# Patient Record
Sex: FEMALE | Race: WHITE | NOT HISPANIC OR LATINO | Employment: FULL TIME | URBAN - METROPOLITAN AREA
[De-identification: names, ages, dates, MRNs, and addresses within clinical notes are randomized per-mention and may not be internally consistent; named-entity substitution may affect disease eponyms.]

---

## 2018-08-06 DIAGNOSIS — J01.10 ACUTE NON-RECURRENT FRONTAL SINUSITIS: ICD-10-CM

## 2018-08-06 RX ORDER — AMOXICILLIN AND CLAVULANATE POTASSIUM 875; 125 MG/1; MG/1
1 TABLET, FILM COATED ORAL EVERY 12 HOURS
Qty: 20 TABLET | Refills: 0 | Status: SHIPPED | OUTPATIENT
Start: 2018-08-06 | End: 2023-07-03

## 2018-11-05 PROBLEM — J01.10 ACUTE NON-RECURRENT FRONTAL SINUSITIS: Status: RESOLVED | Noted: 2018-08-06 | Resolved: 2018-11-05

## 2020-02-17 DIAGNOSIS — G44.209 TENSION HEADACHE: ICD-10-CM

## 2020-02-17 RX ORDER — CYCLOBENZAPRINE HCL 10 MG
10 TABLET ORAL NIGHTLY
Qty: 30 TABLET | Refills: 1 | Status: SHIPPED | OUTPATIENT
Start: 2020-02-17 | End: 2020-02-27

## 2020-04-14 DIAGNOSIS — G43.909 MIGRAINE WITHOUT STATUS MIGRAINOSUS, NOT INTRACTABLE, UNSPECIFIED MIGRAINE TYPE: ICD-10-CM

## 2020-04-14 RX ORDER — TOPIRAMATE 200 MG/1
TABLET ORAL
Qty: 30 TABLET | Refills: 3 | OUTPATIENT
Start: 2020-04-14

## 2020-05-08 DIAGNOSIS — G43.909 MIGRAINE WITHOUT STATUS MIGRAINOSUS, NOT INTRACTABLE, UNSPECIFIED MIGRAINE TYPE: ICD-10-CM

## 2020-05-11 RX ORDER — TOPIRAMATE 200 MG/1
TABLET ORAL
Refills: 3 | OUTPATIENT
Start: 2020-05-11

## 2021-07-20 ENCOUNTER — HOSPITAL ENCOUNTER (OUTPATIENT)
Dept: RADIOLOGY | Facility: HOSPITAL | Age: 41
Discharge: HOME | End: 2021-07-20
Attending: OTOLARYNGOLOGY
Payer: COMMERCIAL

## 2021-07-20 DIAGNOSIS — J32.4 CHRONIC PANSINUSITIS: ICD-10-CM

## 2023-07-03 ENCOUNTER — OFFICE VISIT (OUTPATIENT)
Dept: URGENT CARE | Facility: CLINIC | Age: 43
End: 2023-07-03
Payer: COMMERCIAL

## 2023-07-03 VITALS
WEIGHT: 185 LBS | TEMPERATURE: 98 F | OXYGEN SATURATION: 99 % | DIASTOLIC BLOOD PRESSURE: 70 MMHG | HEART RATE: 74 BPM | HEIGHT: 72 IN | SYSTOLIC BLOOD PRESSURE: 108 MMHG | RESPIRATION RATE: 18 BRPM | BODY MASS INDEX: 25.06 KG/M2

## 2023-07-03 DIAGNOSIS — J32.9 BACTERIAL SINUSITIS: Primary | ICD-10-CM

## 2023-07-03 DIAGNOSIS — B96.89 BACTERIAL SINUSITIS: Primary | ICD-10-CM

## 2023-07-03 PROCEDURE — 99203 PR OFFICE/OUTPT VISIT, NEW, LEVL III, 30-44 MIN: ICD-10-PCS | Mod: S$GLB,,, | Performed by: NURSE PRACTITIONER

## 2023-07-03 PROCEDURE — 99203 OFFICE O/P NEW LOW 30 MIN: CPT | Mod: S$GLB,,, | Performed by: NURSE PRACTITIONER

## 2023-07-03 RX ORDER — FLUTICASONE PROPIONATE 50 MCG
1 SPRAY, SUSPENSION (ML) NASAL DAILY
Qty: 16 G | Refills: 0 | Status: SHIPPED | OUTPATIENT
Start: 2023-07-03

## 2023-07-03 RX ORDER — GALCANEZUMAB 120 MG/ML
INJECTION, SOLUTION SUBCUTANEOUS
COMMUNITY
Start: 2023-06-19

## 2023-07-03 RX ORDER — RIZATRIPTAN BENZOATE 10 MG/1
TABLET ORAL
COMMUNITY
Start: 2023-06-19

## 2023-07-03 RX ORDER — PREDNISONE 10 MG/1
TABLET ORAL
Qty: 8 TABLET | Refills: 0 | Status: SHIPPED | OUTPATIENT
Start: 2023-07-03 | End: 2024-02-15

## 2023-07-03 RX ORDER — PREGABALIN 25 MG/1
1 CAPSULE ORAL
COMMUNITY

## 2023-07-03 RX ORDER — AZITHROMYCIN 250 MG/1
TABLET, FILM COATED ORAL
Qty: 6 TABLET | Refills: 0 | Status: SHIPPED | OUTPATIENT
Start: 2023-07-03 | End: 2024-02-15

## 2023-07-03 NOTE — PATIENT INSTRUCTIONS
You must understand that you've received an Urgent Care treatment only and that you may be released before all your medical problems are known or treated. You, the patient, will arrange for follow up care as instructed.  Follow up with your PCP or specialty clinic as directed in the next 1-2 weeks if not improved or as needed.  You can call [phone] to schedule an appointment with the appropriate provider.  If your condition worsens we recommend that you receive another evaluation at the emergency room immediately or contact your primary medical clinics after hours call service to discuss your concerns.  Please return here or go to the Emergency Department for any concerns or worsening of condition.  Please if you smoke please consider quitting. [facility] Smoke cessation hotline number is [phone], available at this number is free counseling and medications to live a healthier life!       If you were prescribed a narcotic or controlled medication, do not drive or operate heavy equipment or machinery while taking these medications.    If you were not prescribed an antibiotic and your not better please return for a recheck. Antibiotic therapy is not always indicated initially.   Please attempt over the counter medications, give it time and try Echinacea, Zinc and Vitamin C to fight common colds and virus.

## 2023-07-03 NOTE — PROGRESS NOTES
Subjective:       Patient ID: Matthew Hawkins is a 42 y.o. female.    Vitals:  height is 6' (1.829 m) and weight is 83.9 kg (185 lb). Her oral temperature is 98.4 °F (36.9 °C). Her blood pressure is 108/70 and her pulse is 74. Her respiration is 18 and oxygen saturation is 99%.     Chief Complaint: Sinus Problem (Started Wednesday night with a sore throat which is resolving, sinus pressure, runny nose, sneezing, post nasal drip with brown and green mucus.)    This is a 42 y.o. female who presents today with a chief complaint of Started Wednesday night with a sore throat which is resolving, sinus pressure, runny nose, sneezing, post nasal drip with brown and green mucus.  Patient presents with:  Sinus Problem: Started Wednesday night with a sore throat which is resolving, sinus pressure, runny nose, sneezing, post nasal drip with brown and green mucus.         Sinus Problem  This is a new problem. The current episode started in the past 7 days. The problem is unchanged. Her pain is at a severity of 7/10. Associated symptoms include headaches, sinus pressure and sneezing. Past treatments include saline nose sprays, acetaminophen and nasal decongestants. The treatment provided no relief.     HENT:  Positive for sinus pressure.    Allergic/Immunologic: Positive for sneezing.   Neurological:  Positive for headaches.         Objective:      Physical Exam   Constitutional: She is oriented to person, place, and time. She appears well-developed. She is cooperative.  Non-toxic appearance. She does not appear ill. No distress.   HENT:   Head: Normocephalic and atraumatic.   Ears:   Right Ear: Hearing, external ear and ear canal normal. Tympanic membrane is injected and erythematous.   Left Ear: Hearing, external ear and ear canal normal. Tympanic membrane is injected and erythematous.   Nose: Rhinorrhea present. No mucosal edema or nasal deformity. No epistaxis. Right sinus exhibits no maxillary sinus tenderness and no  frontal sinus tenderness. Left sinus exhibits no maxillary sinus tenderness and no frontal sinus tenderness.   Mouth/Throat: Uvula is midline and mucous membranes are normal. No trismus in the jaw. Normal dentition. No uvula swelling. Posterior oropharyngeal erythema present. No oropharyngeal exudate or posterior oropharyngeal edema.   Eyes: Conjunctivae and lids are normal. No scleral icterus.   Neck: Trachea normal and phonation normal. Neck supple. No edema present. No erythema present. No neck rigidity present.   Cardiovascular: Normal rate, regular rhythm, normal heart sounds and normal pulses.   Pulmonary/Chest: Effort normal and breath sounds normal. No respiratory distress. She has no decreased breath sounds. She has no rhonchi.   Abdominal: Normal appearance.   Musculoskeletal: Normal range of motion.         General: No deformity. Normal range of motion.   Neurological: She is alert and oriented to person, place, and time. She exhibits normal muscle tone. Coordination normal.   Skin: Skin is warm, dry, intact, not diaphoretic and not pale.   Psychiatric: Her speech is normal and behavior is normal. Judgment and thought content normal.   Nursing note and vitals reviewed.      Past medical history and current medications reviewed.       Assessment:           1. Bacterial sinusitis              Plan:         Bacterial sinusitis  -     azithromycin (Z-MADELEINE) 250 MG tablet; Take 2 tablets by mouth on day 1; Take 1 tablet by mouth on days 2-5  Dispense: 6 tablet; Refill: 0  -     predniSONE (DELTASONE) 10 MG tablet; Take 2 tabs today, then 1 tab po qd x 6 days  Dispense: 8 tablet; Refill: 0  -     fluticasone propionate (FLONASE) 50 mcg/actuation nasal spray; 1 spray (50 mcg total) by Each Nostril route once daily.  Dispense: 16 g; Refill: 0             Patient Instructions     You must understand that you've received an Urgent Care treatment only and that you may be released before all your medical problems are  known or treated. You, the patient, will arrange for follow up care as instructed.  Follow up with your PCP or specialty clinic as directed in the next 1-2 weeks if not improved or as needed.  You can call [phone] to schedule an appointment with the appropriate provider.  If your condition worsens we recommend that you receive another evaluation at the emergency room immediately or contact your primary medical clinics after hours call service to discuss your concerns.  Please return here or go to the Emergency Department for any concerns or worsening of condition.  Please if you smoke please consider quitting. [facility] hotline number is [phone], available at this number is free counseling and medications to live a healthier life!       If you were prescribed a narcotic or controlled medication, do not drive or operate heavy equipment or machinery while taking these medications.    If you were not prescribed an antibiotic and your not better please return for a recheck. Antibiotic therapy is not always indicated initially.   Please attempt over the counter medications, give it time and try Echinacea, Zinc and Vitamin C to fight common colds and virus.

## 2024-02-15 ENCOUNTER — OFFICE VISIT (OUTPATIENT)
Dept: PODIATRY | Facility: CLINIC | Age: 44
End: 2024-02-15
Payer: COMMERCIAL

## 2024-02-15 VITALS
SYSTOLIC BLOOD PRESSURE: 114 MMHG | DIASTOLIC BLOOD PRESSURE: 75 MMHG | WEIGHT: 183 LBS | BODY MASS INDEX: 24.79 KG/M2 | HEIGHT: 72 IN | HEART RATE: 70 BPM

## 2024-02-15 DIAGNOSIS — L03.031 PARONYCHIA OF GREAT TOE, RIGHT: Primary | ICD-10-CM

## 2024-02-15 DIAGNOSIS — L60.0 INGROWN NAIL: ICD-10-CM

## 2024-02-15 PROCEDURE — 11750 EXCISION NAIL&NAIL MATRIX: CPT | Mod: T5,S$GLB,, | Performed by: PODIATRIST

## 2024-02-15 PROCEDURE — 99202 OFFICE O/P NEW SF 15 MIN: CPT | Mod: 25,S$GLB,, | Performed by: PODIATRIST

## 2024-02-15 PROCEDURE — 1159F MED LIST DOCD IN RCRD: CPT | Mod: S$GLB,,, | Performed by: PODIATRIST

## 2024-02-15 PROCEDURE — 3078F DIAST BP <80 MM HG: CPT | Mod: S$GLB,,, | Performed by: PODIATRIST

## 2024-02-15 PROCEDURE — 99999 PR PBB SHADOW E&M-EST. PATIENT-LVL III: CPT | Mod: PBBFAC,,, | Performed by: PODIATRIST

## 2024-02-15 PROCEDURE — 3008F BODY MASS INDEX DOCD: CPT | Mod: S$GLB,,, | Performed by: PODIATRIST

## 2024-02-15 PROCEDURE — 1160F RVW MEDS BY RX/DR IN RCRD: CPT | Mod: S$GLB,,, | Performed by: PODIATRIST

## 2024-02-15 PROCEDURE — 3074F SYST BP LT 130 MM HG: CPT | Mod: S$GLB,,, | Performed by: PODIATRIST

## 2024-02-15 RX ORDER — PENICILLIN V POTASSIUM 500 MG/1
TABLET, FILM COATED ORAL
COMMUNITY
Start: 2024-02-08 | End: 2024-02-17

## 2024-02-15 RX ORDER — ELETRIPTAN HYDROBROMIDE 20 MG/1
TABLET, FILM COATED ORAL
COMMUNITY

## 2024-02-18 PROBLEM — L03.031 PARONYCHIA OF GREAT TOE, RIGHT: Status: ACTIVE | Noted: 2024-02-18

## 2024-02-18 PROBLEM — L60.0 INGROWN NAIL: Status: ACTIVE | Noted: 2024-02-18

## 2024-02-18 NOTE — PROCEDURES
Nail Removal    Date/Time: 2/15/2024 4:15 PM    Performed by: Pro Roach DPM  Authorized by: Pro Roach DPM    Consent Done?:  Yes (Written)  Time out: Immediately prior to the procedure a time out was called    Prep: patient was prepped and draped in usual sterile fashion    Location:     Location:  Right foot    Location detail:  Right big toe  Anesthesia:     Anesthesia:  Local infiltration    Local anesthetic:  Lidocaine 1% without epinephrine and bupivacaine 0.5% without epinephrine    Anesthetic total (ml):  10  Procedure Details:     Preparation:  Skin prepped with alcohol    Amount removed:  Partial    Side:  Lateral    Wedge excision of skin of nail fold: No      Nail bed sutured?: No      Nail matrix removed:  Partial    Removal method:  Phenol and alcohol    Removed nail replaced and anchored: No      Dressing applied:  4x4, antibiotic ointment and gauze roll    Patient tolerance:  Patient tolerated the procedure well with no immediate complications

## 2024-02-18 NOTE — PROGRESS NOTES
Subjective:       Patient ID: Matthew Hawkins is a 43 y.o. female.    Chief Complaint: Ingrown Toenail (Right great toe/)  Patient presents today she is complaining of an ingrown toenail on her right great toe she states this has been excessively bad for the past 6-7 months she frequently has to dig and trim nail out of the side of her toe in the morning and the evening every day and she states it was infected at 1st but it has not been recently.  I saw the patient previously many years ago I did a total nail permanent matrixectomy on the left the patient's done great she has not had any problems questions or concerns she states she would like to have the permanent procedure performed on her right great toe if possible.    Past Medical History:   Diagnosis Date    Migraines      Past Surgical History:   Procedure Laterality Date    CARDIAC CATHETERIZATION      CARDIAC SURGERY      CHOLECYSTECTOMY      KNEE SURGERY      SINUS SURGERY       Family History   Problem Relation Age of Onset    Prostate cancer Father     Skin cancer Mother      Social History     Socioeconomic History    Marital status:    Tobacco Use    Smoking status: Never   Substance and Sexual Activity    Drug use: Never    Sexual activity: Yes     Partners: Male     Birth control/protection: I.U.D.     Comment: liletta inserted 3/6/19   Social History Narrative    ADVANCED MD PLANS        IUD insertion    Contraceptive Management        Visit Summary    Discussed IUD at length        Return for follow up appointment in one month for IUD check.     GYN Exam (Annual/6Wk PP)10 [facility]8/21/2018     Annual    IUD removal    Contraceptive management    Migraines        Visit Summary    Ordered:    glucose  abnormal w/ reflex  100    Blood Drawing    Lipid Panel    Pap w/ hpv        Discussed progesterone only methods with patient.    Pt wants to try Loloestrin first, discussed risks with chronic migraines. Sample pack given x 2 and a coupon card.     Pt is a nonsmoker.        Return for follow up appointment prn/annual.       Current Outpatient Medications   Medication Sig Dispense Refill    eletriptan (RELPAX) 20 MG tablet Take by mouth.      EMGALITY  mg/mL PnIj SMARTSI Milligram(s) SUB-Q Once a Month      fluticasone propionate (FLONASE) 50 mcg/actuation nasal spray 1 spray (50 mcg total) by Each Nostril route once daily. 16 g 0    onabotulinumtoxinA (BOTOX INJ) Inject as directed.      pregabalin (LYRICA) 25 MG capsule Take 1 capsule by mouth.      rizatriptan (MAXALT) 10 MG tablet SMARTSI Tablet(s) By Mouth 1-2 Times Daily      topiramate (TOPAMAX) 100 MG tablet Take 100 mg by mouth every evening.       No current facility-administered medications for this visit.     Review of patient's allergies indicates:  No Known Allergies    Review of Systems   Musculoskeletal:  Positive for arthralgias.   All other systems reviewed and are negative.      Objective:      Vitals:    02/15/24 1614   BP: 114/75   BP Location: Right arm   Patient Position: Sitting   Pulse: 70   Weight: 83 kg (183 lb)   Height: 6' (1.829 m)     Physical Exam  Vitals and nursing note reviewed.   Constitutional:       Appearance: Normal appearance.   Cardiovascular:      Pulses:           Dorsalis pedis pulses are 2+ on the right side and 2+ on the left side.        Posterior tibial pulses are 2+ on the right side and 2+ on the left side.   Pulmonary:      Effort: Pulmonary effort is normal.   Musculoskeletal:         General: Tenderness present.        Feet:    Feet:      Right foot:      Protective Sensation: 2 sites tested.  2 sites sensed.      Skin integrity: Erythema present.      Toenail Condition: Right toenails are ingrown.      Left foot:      Protective Sensation: 2 sites tested.  2 sites sensed.   Skin:     Capillary Refill: Capillary refill takes less than 2 seconds.      Findings: Erythema present.   Neurological:      General: No focal deficit present.       Mental Status: She is alert.   Psychiatric:         Mood and Affect: Mood normal.         Behavior: Behavior normal.            Assessment:       1. Paronychia of great toe, right    2. Ingrown nail        Plan:       Patient presents today she is complaining of an ingrown toenail on her right great toe she states this has been excessively bad for the past 6-7 months she frequently has to dig and trim nail out of the side of her toe in the morning and the evening every day and she states it was infected at 1st but it has not been recently.  I saw the patient previously many years ago I did a total nail permanent matrixectomy on the left the patient's done great she has not had any problems questions or concerns she states she would like to have the permanent procedure performed on her right great toe if possible.  A comprehensive new patient evaluation was performed today I was not able to see the patient's notes from previous visit many years ago as it was in a previous computer eyes system and not currently available.  I did evaluate the patient's left hallux which looks very good and she has not had any issues with after I had previously performed a permanent matrixectomy of the entire nail plate on the left on the right foot patient does have significantly ingrowing nail that you can see that the patient has previously trimmed on the lateral border of the right great toe.  Patient does not currently have infection although there is some erythema and irritation around the area patient does have pain upon palpation of the area.  I did discuss at length and in detail with the patient a permanent matrixectomy via verses a regular nail avulsion with the patient's history I feel doing a regular nail avulsion the nail will likely grow back and become problematic in the future patient indicated she would like to proceed with the permanent matrixectomy as discussed since she has had such good success with the permanent  procedure on the left great toe.  Patient underwent a permanent matrixectomy lateral border right hallux she tolerated the procedure well she was given postoperative instructions and will be seen for follow-up as needed patient advised any problems questions concerns certainly to contact us otherwise I will see her as needed.  Separate evaluation and management for new patient exam prior to making the decision to pursue the permanent matrixectomy was performed today.This note was created using [employer] voice recognition software that occasionally misinterpreted phrases or words.

## 2024-03-08 ENCOUNTER — HOSPITAL ENCOUNTER (OUTPATIENT)
Dept: RADIOLOGY | Facility: HOSPITAL | Age: 44
Discharge: HOME | End: 2024-03-08
Attending: OTOLARYNGOLOGY
Payer: COMMERCIAL

## 2024-03-08 DIAGNOSIS — J32.4 CHRONIC PANSINUSITIS: ICD-10-CM

## 2024-03-08 PROCEDURE — 70486 CT MAXILLOFACIAL W/O DYE: CPT | Mod: 26,,, | Performed by: RADIOLOGY

## 2024-03-08 PROCEDURE — 70486 CT MAXILLOFACIAL W/O DYE: CPT | Mod: TC

## 2024-05-01 ENCOUNTER — OFFICE VISIT (OUTPATIENT)
Dept: PODIATRY | Facility: CLINIC | Age: 44
End: 2024-05-01
Payer: COMMERCIAL

## 2024-05-01 VITALS — WEIGHT: 185 LBS | HEIGHT: 72 IN | BODY MASS INDEX: 25.06 KG/M2

## 2024-05-01 DIAGNOSIS — L60.0 INGROWN NAIL: Primary | ICD-10-CM

## 2024-05-01 PROCEDURE — 1160F RVW MEDS BY RX/DR IN RCRD: CPT | Mod: S$GLB,,, | Performed by: PODIATRIST

## 2024-05-01 PROCEDURE — 1159F MED LIST DOCD IN RCRD: CPT | Mod: S$GLB,,, | Performed by: PODIATRIST

## 2024-05-01 PROCEDURE — 3008F BODY MASS INDEX DOCD: CPT | Mod: S$GLB,,, | Performed by: PODIATRIST

## 2024-05-01 PROCEDURE — 99212 OFFICE O/P EST SF 10 MIN: CPT | Mod: S$GLB,,, | Performed by: PODIATRIST

## 2024-05-01 PROCEDURE — 99999 PR PBB SHADOW E&M-EST. PATIENT-LVL III: CPT | Mod: PBBFAC,,, | Performed by: PODIATRIST

## 2024-05-01 RX ORDER — NYSTATIN 100000 [USP'U]/ML
SUSPENSION ORAL
COMMUNITY
Start: 2024-02-19

## 2024-05-01 RX ORDER — NORTRIPTYLINE HYDROCHLORIDE 25 MG/1
25 CAPSULE ORAL NIGHTLY
COMMUNITY
Start: 2024-04-15

## 2024-05-04 NOTE — PROGRESS NOTES
Subjective:       Patient ID: Matthew Hawkins is a 43 y.o. female.    Chief Complaint: Nail Problem (Right great toe ) and Toe Pain  Patient presents today for follow-up she was concerned that she may be getting an ingrown toenail coming back on the right great toe lateral border where she had a previous permanent matrixectomy over 2 months ago.  Past Medical History:   Diagnosis Date    Migraines      Past Surgical History:   Procedure Laterality Date    CARDIAC CATHETERIZATION      CARDIAC SURGERY      CHOLECYSTECTOMY      KNEE SURGERY      SINUS SURGERY       Family History   Problem Relation Name Age of Onset    Prostate cancer Father      Skin cancer Mother       Social History     Socioeconomic History    Marital status:    Tobacco Use    Smoking status: Never   Substance and Sexual Activity    Drug use: Never    Sexual activity: Yes     Partners: Male     Birth control/protection: I.U.D.     Comment: liletta inserted 3/6/19   Social History Narrative    ADVANCED MD PLANS        IUD insertion    Contraceptive Management        Visit Summary    Discussed IUD at length        Return for follow up appointment in one month for IUD check.     [facility]     Annual    IUD removal    Contraceptive management    Migraines        Visit Summary    Ordered:    glucose  abnormal w/ reflex  100    Blood Drawing    Lipid Panel    Pap w/ hpv        Discussed progesterone only methods with patient.    Pt wants to try Loloestrin first, discussed risks with chronic migraines. Sample pack given x 2 and a coupon card.    Pt is a nonsmoker.        Return for follow up appointment prn/annual.       Current Outpatient Medications   Medication Sig Dispense Refill    eletriptan (RELPAX) 20 MG tablet Take by mouth.      fluticasone propionate (FLONASE) 50 mcg/actuation nasal spray 1 spray (50 mcg total) by Each Nostril route once daily. 16 g 0    nortriptyline (PAMELOR) 25 MG capsule Take 25  mg by mouth every evening.      nystatin (MYCOSTATIN) 100,000 unit/mL suspension take 5 milliliters by oral route 4 times every day      pregabalin (LYRICA) 25 MG capsule Take 1 capsule by mouth.      rizatriptan (MAXALT) 10 MG tablet SMARTSI Tablet(s) By Mouth 1-2 Times Daily      topiramate (TOPAMAX) 100 MG tablet Take 100 mg by mouth every evening.      onabotulinumtoxinA (BOTOX INJ) Inject as directed. (Patient not taking: Reported on 2024)       No current facility-administered medications for this visit.     Review of patient's allergies indicates:  No Known Allergies    Review of Systems   Musculoskeletal:  Positive for arthralgias.   All other systems reviewed and are negative.      Objective:      Vitals:    24 1114   Weight: 83.9 kg (185 lb)   Height: 6' (1.829 m)     Physical Exam  Vitals and nursing note reviewed.   Constitutional:       Appearance: Normal appearance.   Cardiovascular:      Pulses:           Dorsalis pedis pulses are 2+ on the right side and 2+ on the left side.        Posterior tibial pulses are 2+ on the right side and 2+ on the left side.   Pulmonary:      Effort: Pulmonary effort is normal.   Musculoskeletal:         General: Tenderness present.        Feet:    Feet:      Right foot:      Protective Sensation: 2 sites tested.  2 sites sensed.      Skin integrity: Skin integrity normal.      Left foot:      Protective Sensation: 2 sites tested.  2 sites sensed.   Skin:     General: Skin is warm.      Capillary Refill: Capillary refill takes less than 2 seconds.   Neurological:      General: No focal deficit present.      Mental Status: She is alert.   Psychiatric:         Mood and Affect: Mood normal.         Behavior: Behavior normal.            Assessment:       1. Ingrown nail        Plan:       Patient presents today for follow-up she was concerned that she may be getting an ingrown toenail coming back on the right great toe lateral border where she had a previous  permanent matrixectomy over 2 months ago.  On evaluation the area does not display ingrowing toenail nor does it display infection on the lateral border of the right hallux the patient does have some separation of the nail from the nail bed likely due to the procedure I carefully trimmed and debrided this area advising the patient as the nail grows out this should become less problematic I feel that she was just getting a little bit of pressure in association with some callus tissue along the lateral border right great toe.  Patient was in understanding and agreement with this nail was trimmed as was some of the callus tissue recommended follow-up as needed patient will contact us with any problems questions or concerns.  This note was created using [employer] voice recognition software that occasionally misinterpreted phrases or words.

## 2024-10-29 ENCOUNTER — HOSPITAL ENCOUNTER (OUTPATIENT)
Dept: RADIOLOGY | Facility: HOSPITAL | Age: 44
Discharge: HOME | End: 2024-10-29
Payer: COMMERCIAL

## 2024-10-29 DIAGNOSIS — G43.909 MIGRAINE: ICD-10-CM

## 2024-10-29 PROCEDURE — A9585 GADOBUTROL INJECTION: HCPCS

## 2024-10-29 PROCEDURE — 70553 MRI BRAIN STEM W/O & W/DYE: CPT | Mod: 26,,, | Performed by: RADIOLOGY

## 2024-10-29 PROCEDURE — 25500020 PHARM REV CODE 255

## 2024-10-29 PROCEDURE — 70553 MRI BRAIN STEM W/O & W/DYE: CPT | Mod: TC

## 2024-10-29 RX ORDER — GADOBUTROL 604.72 MG/ML
8 INJECTION INTRAVENOUS
Status: COMPLETED | OUTPATIENT
Start: 2024-10-29 | End: 2024-10-29

## 2024-10-29 RX ADMIN — GADOBUTROL 8 ML: 604.72 INJECTION INTRAVENOUS at 10:10

## 2024-11-22 ENCOUNTER — APPOINTMENT (OUTPATIENT)
Dept: LAB | Facility: HOSPITAL | Age: 44
End: 2024-11-22
Attending: OTOLARYNGOLOGY
Payer: COMMERCIAL

## 2024-11-22 DIAGNOSIS — J32.4 CHRONIC PANSINUSITIS: Primary | ICD-10-CM

## 2024-11-22 PROCEDURE — 87075 CULTR BACTERIA EXCEPT BLOOD: CPT | Performed by: OTOLARYNGOLOGY

## 2024-11-22 PROCEDURE — 87102 FUNGUS ISOLATION CULTURE: CPT | Performed by: OTOLARYNGOLOGY

## 2024-11-22 PROCEDURE — 87070 CULTURE OTHR SPECIMN AEROBIC: CPT | Performed by: OTOLARYNGOLOGY

## 2024-11-25 LAB — BACTERIA SPEC AEROBE CULT: NORMAL

## 2024-11-26 ENCOUNTER — ANESTHESIA EVENT (OUTPATIENT)
Dept: SURGERY | Facility: HOSPITAL | Age: 44
End: 2024-11-26
Payer: COMMERCIAL

## 2024-11-26 LAB — BACTERIA SPEC ANAEROBE CULT: NORMAL

## 2024-11-26 NOTE — ANESTHESIA PREPROCEDURE EVALUATION
11/26/2024  Matthew Hawkins is a 44 y.o., female.  has a past surgical history that includes Knee surgery; Sinus surgery; Cholecystectomy; Cardiac catheterization; and Cardiac surgery.   TMJ surgery 2023 wo issues      Pre-op Assessment    I have reviewed the Patient Summary Reports.     I have reviewed the Nursing Notes. I have reviewed the NPO Status.   I have reviewed the Medications.     Review of Systems  Anesthesia Hx:  No problems with previous Anesthesia             Denies Family Hx of Anesthesia complications.    Denies Personal Hx of Anesthesia complications.                    Social:  Non-Smoker       Hematology/Oncology:  Hematology Normal   Oncology Normal                                   EENT/Dental:  EENT/Dental Normal  Chronic sinusitis          Cardiovascular:  Cardiovascular Normal                  ECG has been reviewed. EKG reviewed.  2023.  Outside record.  SB, 58  Chart indicates cath in past -  states ahd an ablation at age 19, no further issues since.                           Pulmonary:  Pulmonary Normal                       Renal/:  Renal/ Normal                 Hepatic/GI:  Hepatic/GI Normal                    Musculoskeletal:  Musculoskeletal Normal                Neurological:      Headaches     MRI unremarkable                            Endocrine:  Endocrine Normal            Dermatological:  Skin Normal    Psych:  Psychiatric Normal                    Physical Exam  General: Well nourished, Cooperative, Alert and Oriented    Airway:  Mallampati: II   Mouth Opening: Normal  TM Distance: 4 - 6 cm  Tongue: Normal  Neck ROM: Normal ROM    Dental:  Intact    Chest/Lungs:  Clear to auscultation    Heart:  Rate: Normal  Rhythm: Regular Rhythm  Sounds: Normal        Anesthesia Plan  Type of Anesthesia, risks & benefits discussed:    Anesthesia Type: Gen ETT  Intra-op  Monitoring Plan: Standard ASA Monitors  Post Op Pain Control Plan: multimodal analgesia  Induction:  IV  Airway Plan: Video, Post-Induction  Informed Consent: Informed consent signed with the Patient and all parties understand the risks and agree with anesthesia plan.  All questions answered.   ASA Score: 2  Day of Surgery Review of History & Physical: H&P Update referred to the surgeon/provider.    Ready For Surgery From Anesthesia Perspective.     .

## 2024-11-27 ENCOUNTER — LAB VISIT (OUTPATIENT)
Dept: LAB | Facility: HOSPITAL | Age: 44
End: 2024-11-27
Attending: OTOLARYNGOLOGY
Payer: COMMERCIAL

## 2024-11-27 DIAGNOSIS — Z01.818 PRE-OP TESTING: Primary | ICD-10-CM

## 2024-11-27 LAB
ALBUMIN SERPL BCP-MCNC: 3.9 G/DL (ref 3.5–5.2)
ALP SERPL-CCNC: 69 U/L (ref 40–150)
ALT SERPL W/O P-5'-P-CCNC: 25 U/L (ref 10–44)
ANION GAP SERPL CALC-SCNC: 11 MMOL/L (ref 8–16)
AST SERPL-CCNC: 20 U/L (ref 10–40)
B-HCG UR QL: NEGATIVE
BILIRUB SERPL-MCNC: 0.5 MG/DL (ref 0.1–1)
BUN SERPL-MCNC: 11 MG/DL (ref 6–20)
CALCIUM SERPL-MCNC: 9.6 MG/DL (ref 8.7–10.5)
CHLORIDE SERPL-SCNC: 108 MMOL/L (ref 95–110)
CO2 SERPL-SCNC: 21 MMOL/L (ref 23–29)
CREAT SERPL-MCNC: 0.8 MG/DL (ref 0.5–1.4)
ERYTHROCYTE [DISTWIDTH] IN BLOOD BY AUTOMATED COUNT: 12.9 % (ref 11.5–14.5)
EST. GFR  (NO RACE VARIABLE): >60 ML/MIN/1.73 M^2
GLUCOSE SERPL-MCNC: 105 MG/DL (ref 70–110)
HCT VFR BLD AUTO: 42.2 % (ref 37–48.5)
HGB BLD-MCNC: 13.7 G/DL (ref 12–16)
MCH RBC QN AUTO: 32.5 PG (ref 27–31)
MCHC RBC AUTO-ENTMCNC: 32.5 G/DL (ref 32–36)
MCV RBC AUTO: 100 FL (ref 82–98)
PLATELET # BLD AUTO: 334 K/UL (ref 150–450)
PMV BLD AUTO: 9.7 FL (ref 9.2–12.9)
POTASSIUM SERPL-SCNC: 3.9 MMOL/L (ref 3.5–5.1)
PROT SERPL-MCNC: 7.1 G/DL (ref 6–8.4)
RBC # BLD AUTO: 4.22 M/UL (ref 4–5.4)
SODIUM SERPL-SCNC: 140 MMOL/L (ref 136–145)
WBC # BLD AUTO: 9.61 K/UL (ref 3.9–12.7)

## 2024-11-27 PROCEDURE — 85027 COMPLETE CBC AUTOMATED: CPT | Performed by: OTOLARYNGOLOGY

## 2024-11-27 PROCEDURE — 36415 COLL VENOUS BLD VENIPUNCTURE: CPT | Performed by: OTOLARYNGOLOGY

## 2024-11-27 PROCEDURE — 80053 COMPREHEN METABOLIC PANEL: CPT | Performed by: OTOLARYNGOLOGY

## 2024-11-27 PROCEDURE — 81025 URINE PREGNANCY TEST: CPT | Performed by: OTOLARYNGOLOGY

## 2024-11-28 LAB — FUNGUS SPEC CULT: NORMAL

## 2024-11-29 ENCOUNTER — HOSPITAL ENCOUNTER (OUTPATIENT)
Facility: HOSPITAL | Age: 44
Discharge: HOME | End: 2024-11-29
Attending: OTOLARYNGOLOGY | Admitting: OTOLARYNGOLOGY
Payer: COMMERCIAL

## 2024-11-29 ENCOUNTER — ANESTHESIA (OUTPATIENT)
Dept: SURGERY | Facility: HOSPITAL | Age: 44
End: 2024-11-29
Payer: COMMERCIAL

## 2024-11-29 VITALS
HEART RATE: 65 BPM | TEMPERATURE: 98 F | DIASTOLIC BLOOD PRESSURE: 75 MMHG | RESPIRATION RATE: 15 BRPM | WEIGHT: 192 LBS | SYSTOLIC BLOOD PRESSURE: 129 MMHG | OXYGEN SATURATION: 98 % | BODY MASS INDEX: 26.01 KG/M2 | HEIGHT: 72 IN

## 2024-11-29 PROBLEM — J32.3 CHRONIC SPHENOIDAL SINUSITIS: Status: ACTIVE | Noted: 2024-11-29

## 2024-11-29 PROBLEM — J32.2 CHRONIC ETHMOIDAL SINUSITIS: Status: ACTIVE | Noted: 2024-11-29

## 2024-11-29 PROBLEM — J98.8 UPPER RESPIRATORY TRACT OBSTRUCTION: Status: RESOLVED | Noted: 2024-11-29 | Resolved: 2024-11-29

## 2024-11-29 PROBLEM — J32.0 CHRONIC MAXILLARY SINUSITIS: Status: ACTIVE | Noted: 2024-11-29

## 2024-11-29 PROBLEM — J34.3 HYPERTROPHY OF NASAL TURBINATES: Status: RESOLVED | Noted: 2024-11-29 | Resolved: 2024-11-29

## 2024-11-29 PROBLEM — J32.0 CHRONIC MAXILLARY SINUSITIS: Status: RESOLVED | Noted: 2024-11-29 | Resolved: 2024-11-29

## 2024-11-29 PROBLEM — J98.8 UPPER RESPIRATORY TRACT OBSTRUCTION: Status: ACTIVE | Noted: 2024-11-29

## 2024-11-29 PROBLEM — J32.3 CHRONIC SPHENOIDAL SINUSITIS: Status: RESOLVED | Noted: 2024-11-29 | Resolved: 2024-11-29

## 2024-11-29 PROBLEM — J34.3 HYPERTROPHY OF NASAL TURBINATES: Status: ACTIVE | Noted: 2024-11-29

## 2024-11-29 PROBLEM — J32.2 CHRONIC ETHMOIDAL SINUSITIS: Status: RESOLVED | Noted: 2024-11-29 | Resolved: 2024-11-29

## 2024-11-29 PROCEDURE — 87102 FUNGUS ISOLATION CULTURE: CPT | Performed by: OTOLARYNGOLOGY

## 2024-11-29 PROCEDURE — 27201423 OPTIME MED/SURG SUP & DEVICES STERILE SUPPLY: Performed by: OTOLARYNGOLOGY

## 2024-11-29 PROCEDURE — 71000033 HC RECOVERY, INTIAL HOUR: Performed by: OTOLARYNGOLOGY

## 2024-11-29 PROCEDURE — C1726 CATH, BAL DIL, NON-VASCULAR: HCPCS | Performed by: OTOLARYNGOLOGY

## 2024-11-29 PROCEDURE — 87070 CULTURE OTHR SPECIMN AEROBIC: CPT | Performed by: OTOLARYNGOLOGY

## 2024-11-29 PROCEDURE — 25000003 PHARM REV CODE 250: Performed by: OTOLARYNGOLOGY

## 2024-11-29 PROCEDURE — 36000709 HC OR TIME LEV III EA ADD 15 MIN: Performed by: OTOLARYNGOLOGY

## 2024-11-29 PROCEDURE — 63600175 PHARM REV CODE 636 W HCPCS

## 2024-11-29 PROCEDURE — 37000009 HC ANESTHESIA EA ADD 15 MINS: Performed by: OTOLARYNGOLOGY

## 2024-11-29 PROCEDURE — 87077 CULTURE AEROBIC IDENTIFY: CPT | Performed by: OTOLARYNGOLOGY

## 2024-11-29 PROCEDURE — 87076 CULTURE ANAEROBE IDENT EACH: CPT | Performed by: OTOLARYNGOLOGY

## 2024-11-29 PROCEDURE — 71000015 HC POSTOP RECOV 1ST HR: Performed by: OTOLARYNGOLOGY

## 2024-11-29 PROCEDURE — 37000008 HC ANESTHESIA 1ST 15 MINUTES: Performed by: OTOLARYNGOLOGY

## 2024-11-29 PROCEDURE — 63600175 PHARM REV CODE 636 W HCPCS: Performed by: OTOLARYNGOLOGY

## 2024-11-29 PROCEDURE — 36000708 HC OR TIME LEV III 1ST 15 MIN: Performed by: OTOLARYNGOLOGY

## 2024-11-29 PROCEDURE — 87075 CULTR BACTERIA EXCEPT BLOOD: CPT | Performed by: OTOLARYNGOLOGY

## 2024-11-29 PROCEDURE — 25000003 PHARM REV CODE 250

## 2024-11-29 PROCEDURE — 87186 SC STD MICRODIL/AGAR DIL: CPT | Performed by: OTOLARYNGOLOGY

## 2024-11-29 RX ORDER — MIDAZOLAM HYDROCHLORIDE 1 MG/ML
INJECTION INTRAMUSCULAR; INTRAVENOUS
Status: DISCONTINUED | OUTPATIENT
Start: 2024-11-29 | End: 2024-11-29

## 2024-11-29 RX ORDER — OXYMETAZOLINE HCL 0.05 %
SPRAY, NON-AEROSOL (ML) NASAL
Status: DISCONTINUED | OUTPATIENT
Start: 2024-11-29 | End: 2024-11-29 | Stop reason: HOSPADM

## 2024-11-29 RX ORDER — GLUCAGON 1 MG
1 KIT INJECTION
Status: DISCONTINUED | OUTPATIENT
Start: 2024-11-29 | End: 2024-11-29 | Stop reason: HOSPADM

## 2024-11-29 RX ORDER — LIDOCAINE HYDROCHLORIDE 10 MG/ML
1 INJECTION, SOLUTION EPIDURAL; INFILTRATION; INTRACAUDAL; PERINEURAL ONCE
Status: DISCONTINUED | OUTPATIENT
Start: 2024-11-29 | End: 2024-11-29 | Stop reason: HOSPADM

## 2024-11-29 RX ORDER — OXYCODONE HYDROCHLORIDE 5 MG/1
5 TABLET ORAL ONCE AS NEEDED
Status: DISCONTINUED | OUTPATIENT
Start: 2024-11-29 | End: 2024-11-29 | Stop reason: HOSPADM

## 2024-11-29 RX ORDER — BUPIVACAINE HYDROCHLORIDE 5 MG/ML
INJECTION, SOLUTION EPIDURAL; INTRACAUDAL
Status: DISCONTINUED | OUTPATIENT
Start: 2024-11-29 | End: 2024-11-29 | Stop reason: HOSPADM

## 2024-11-29 RX ORDER — ONDANSETRON HYDROCHLORIDE 2 MG/ML
INJECTION, SOLUTION INTRAMUSCULAR; INTRAVENOUS
Status: DISCONTINUED | OUTPATIENT
Start: 2024-11-29 | End: 2024-11-29

## 2024-11-29 RX ORDER — MEPERIDINE HYDROCHLORIDE 50 MG/ML
12.5 INJECTION INTRAMUSCULAR; INTRAVENOUS; SUBCUTANEOUS EVERY 10 MIN PRN
Status: DISCONTINUED | OUTPATIENT
Start: 2024-11-29 | End: 2024-11-29 | Stop reason: HOSPADM

## 2024-11-29 RX ORDER — HYDROMORPHONE HYDROCHLORIDE 2 MG/ML
0.5 INJECTION, SOLUTION INTRAMUSCULAR; INTRAVENOUS; SUBCUTANEOUS EVERY 5 MIN PRN
Status: DISCONTINUED | OUTPATIENT
Start: 2024-11-29 | End: 2024-11-29 | Stop reason: HOSPADM

## 2024-11-29 RX ORDER — FENTANYL CITRATE 50 UG/ML
INJECTION, SOLUTION INTRAMUSCULAR; INTRAVENOUS
Status: DISCONTINUED | OUTPATIENT
Start: 2024-11-29 | End: 2024-11-29

## 2024-11-29 RX ORDER — PROPOFOL 10 MG/ML
VIAL (ML) INTRAVENOUS
Status: DISCONTINUED | OUTPATIENT
Start: 2024-11-29 | End: 2024-11-29

## 2024-11-29 RX ORDER — SODIUM CHLORIDE, SODIUM LACTATE, POTASSIUM CHLORIDE, CALCIUM CHLORIDE 600; 310; 30; 20 MG/100ML; MG/100ML; MG/100ML; MG/100ML
125 INJECTION, SOLUTION INTRAVENOUS CONTINUOUS
Status: DISCONTINUED | OUTPATIENT
Start: 2024-11-29 | End: 2024-11-29 | Stop reason: HOSPADM

## 2024-11-29 RX ORDER — ROCURONIUM BROMIDE 10 MG/ML
INJECTION, SOLUTION INTRAVENOUS
Status: DISCONTINUED | OUTPATIENT
Start: 2024-11-29 | End: 2024-11-29

## 2024-11-29 RX ORDER — LIDOCAINE HCL/EPINEPHRINE/PF 2%-1:200K
VIAL (ML) INJECTION
Status: DISCONTINUED | OUTPATIENT
Start: 2024-11-29 | End: 2024-11-29 | Stop reason: HOSPADM

## 2024-11-29 RX ORDER — ONDANSETRON HYDROCHLORIDE 2 MG/ML
4 INJECTION, SOLUTION INTRAVENOUS DAILY PRN
Status: DISCONTINUED | OUTPATIENT
Start: 2024-11-29 | End: 2024-11-29 | Stop reason: HOSPADM

## 2024-11-29 RX ORDER — TRIAMCINOLONE ACETONIDE 40 MG/ML
INJECTION, SUSPENSION INTRA-ARTICULAR; INTRAMUSCULAR
Status: DISCONTINUED | OUTPATIENT
Start: 2024-11-29 | End: 2024-11-29 | Stop reason: HOSPADM

## 2024-11-29 RX ORDER — CEFAZOLIN SODIUM 1 G/3ML
INJECTION, POWDER, FOR SOLUTION INTRAMUSCULAR; INTRAVENOUS
Status: DISCONTINUED | OUTPATIENT
Start: 2024-11-29 | End: 2024-11-29

## 2024-11-29 RX ORDER — LIDOCAINE HYDROCHLORIDE 20 MG/ML
INJECTION, SOLUTION EPIDURAL; INFILTRATION; INTRACAUDAL; PERINEURAL
Status: DISCONTINUED | OUTPATIENT
Start: 2024-11-29 | End: 2024-11-29

## 2024-11-29 RX ADMIN — ONDANSETRON 4 MG: 2 INJECTION INTRAMUSCULAR; INTRAVENOUS at 09:11

## 2024-11-29 RX ADMIN — CEFAZOLIN 1 G: 330 INJECTION, POWDER, FOR SOLUTION INTRAMUSCULAR; INTRAVENOUS at 09:11

## 2024-11-29 RX ADMIN — SUGAMMADEX 200 MG: 100 INJECTION, SOLUTION INTRAVENOUS at 10:11

## 2024-11-29 RX ADMIN — ROCURONIUM BROMIDE 20 MG: 10 INJECTION, SOLUTION INTRAVENOUS at 10:11

## 2024-11-29 RX ADMIN — SODIUM CHLORIDE, POTASSIUM CHLORIDE, SODIUM LACTATE AND CALCIUM CHLORIDE: 600; 310; 30; 20 INJECTION, SOLUTION INTRAVENOUS at 09:11

## 2024-11-29 RX ADMIN — PROPOFOL 150 MG: 10 INJECTION, EMULSION INTRAVENOUS at 09:11

## 2024-11-29 RX ADMIN — FENTANYL CITRATE 100 MCG: 50 INJECTION INTRAMUSCULAR; INTRAVENOUS at 09:11

## 2024-11-29 RX ADMIN — ROCURONIUM BROMIDE 50 MG: 10 INJECTION, SOLUTION INTRAVENOUS at 09:11

## 2024-11-29 RX ADMIN — LIDOCAINE HYDROCHLORIDE 50 MG: 20 INJECTION, SOLUTION EPIDURAL; INFILTRATION; INTRACAUDAL; PERINEURAL at 09:11

## 2024-11-29 RX ADMIN — MIDAZOLAM HYDROCHLORIDE 2 MG: 1 INJECTION, SOLUTION INTRAMUSCULAR; INTRAVENOUS at 09:11

## 2024-11-29 NOTE — DISCHARGE SUMMARY
[place] - Surgery    Discharge Note        SUMMARY     Admit Date: 2024    Attending Physician: Dennis Martinez MD     Discharge Physician: Dennis Martinez MD    Discharge Date: 2024 11:48 AM    Final Diagnosis: chronic sinusitis    Hospital Course: Patient tolerated procedure well.     Disposition: Home or Self Care    Patient Instructions:   Current Discharge Medication List        CONTINUE these medications which have NOT CHANGED    Details   eletriptan (RELPAX) 20 MG tablet Take by mouth.      fluticasone propionate (FLONASE) 50 mcg/actuation nasal spray 1 spray (50 mcg total) by Each Nostril route once daily.  Qty: 16 g, Refills: 0    Associated Diagnoses: Bacterial sinusitis      nortriptyline (PAMELOR) 25 MG capsule Take 25 mg by mouth every evening.      pregabalin (LYRICA) 25 MG capsule Take 1 capsule by mouth.      topiramate (TOPAMAX) 100 MG tablet Take 100 mg by mouth every evening.      nystatin (MYCOSTATIN) 100,000 unit/mL suspension take 5 milliliters by oral route 4 times every day      onabotulinumtoxinA (BOTOX INJ) Inject as directed.      rizatriptan (MAXALT) 10 MG tablet SMARTSI Tablet(s) By Mouth 1-2 Times Daily             Discharge Procedure Orders (must include Diet, Follow-up, Activity):  No discharge procedures on file.     Follow Up:  Follow up as scheduled.  Resume routine diet.  Activity as tolerated.

## 2024-11-29 NOTE — TRANSFER OF CARE
Anesthesia Transfer of Care Note    Patient: Matthew Hawkins    Procedure(s) Performed: Procedure(s) (LRB):  FESS (Bilateral)  NASAL TURBINECTOMY (Bilateral)    Patient location: PACU    Anesthesia Type: general    Transport from OR: Transported from OR on room air with adequate spontaneous ventilation    Post pain: adequate analgesia    Post assessment: no apparent anesthetic complications and tolerated procedure well    Post vital signs: stable    Level of consciousness: awake, alert and oriented    Nausea/Vomiting: no nausea/vomiting    Complications: none    Transfer of care protocol was followed      Last vitals: Visit Vitals  /70   Pulse 76   Temp 36.6 °C (97.8 °F) (Temporal)   Resp 10   Ht 6' (1.829 m)   Wt 87.1 kg (192 lb)   SpO2 97%   Breastfeeding No   BMI 26.04 kg/m²

## 2024-11-29 NOTE — PLAN OF CARE
Dr. Martinez to bedside speaking with patient and father regarding surgical outcome and discharge plan of care. V/u.

## 2024-11-29 NOTE — OP NOTE
[facility] - [facility]  Operative Note     SUMMARY     Surgery Date: 11/29/2024       Pre-op Diagnosis:  Chronic ethmoidal sinusitis [J32.2]  Chronic sphenoidal sinusitis [J32.3]  Chronic frontal sinusitis [J32.1]  Chronic maxillary sinusitis [J32.0]  Hypertrophy of nasal turbinates [J34.3]  Upper respiratory tract obstruction [J98.8]    Post-op Diagnosis:  Post-Op Diagnosis Codes:     * Chronic ethmoidal sinusitis [J32.2]     * Chronic sphenoidal sinusitis [J32.3]     * Chronic frontal sinusitis [J32.1]     * Chronic maxillary sinusitis [J32.0]     * Hypertrophy of nasal turbinates [J34.3]     * Upper respiratory tract obstruction [J98.8]    Procedure(s) (LRB):  FESS (Bilateral)  NASAL TURBINECTOMY (Bilateral)    Surgeons and Role:     * Dennis Martinez MD - Primary      Estimated Blood Loss: * No values recorded between 11/29/2024  9:13 AM and 11/29/2024 10:37 AM *    Anesthesia:  General    Description of the findings of the procedure:  Chronic ethmoidal sinusitis [J32.2]  Chronic sphenoidal sinusitis [J32.3]  Chronic frontal sinusitis [J32.1]  Chronic maxillary sinusitis [J32.0]  Hypertrophy of nasal turbinates [J34.3]  Upper respiratory tract obstruction [J98.8]           Procedure: [facility] - [facility]  Operative Note    OP Note      Date of Procedure: 11/29/2024       Pre-Operative Diagnosis:   Chronic ethmoidal sinusitis [J32.2]  Chronic sphenoidal sinusitis [J32.3]  Chronic frontal sinusitis [J32.1]  Chronic maxillary sinusitis [J32.0]  Hypertrophy of nasal turbinates [J34.3]  Upper respiratory tract obstruction [J98.8]    Post-Operative Diagnosis:   Post-Op Diagnosis Codes:     * Chronic ethmoidal sinusitis [J32.2]     * Chronic sphenoidal sinusitis [J32.3]     * Chronic frontal sinusitis [J32.1]     * Chronic maxillary sinusitis [J32.0]     * Hypertrophy of nasal turbinates [J34.3]     * Upper respiratory tract obstruction [J98.8]    Anesthesia: General    Procedures performed:   FESS: 36346 (CPT®)  NASAL  TURBINECTOMY: 35155 (CPT®)    Surgeon: Dennis Martinez MD    Procedure In Detail:   The patient was taken to the operating room and placed in the supine position. An IV was placed into the patient's arm. After a satisfactory general anesthetic had been obtained, the procedure was begun. Four pledgets of 4 mL of oxymetazoline were placed into the patient's nose. The patient's nose was injected with lidocaine 1% with epinephrine 1:100,000; approximately 10 mL were used. The patient was prepped and draped in the standard fashion for a nasal operation.    Under endoscopic vision, the [place] was walked down the lateral nasal wall of the right nostril until it fell into the anterior ethmoid cells. The anterior ethmoid cells were exenterated back to the lamina ethmoidalis. The lamina ethmoidalis was breached and the posterior ethmoid cells exenterated back to the rostrum of the sphenoid. Under endoscopic vision, the sphenoid ostium was viewed and palpated and opened to approximately 5 mm in diameter. The 30 degree scope was taken and the maxillary sinus viewed and palpated and the ostium opened to approximately 1 cm in diameter. With the 70 degree scope, the nasofrontal duct area was viewed and the anterior ethmoid cells taken down until the nasofrontal duct was seen to empty widely into the nose. This nostril was then packed with 2 oxymetazoline pledgets and attention turned to the left side.    The Monty was walked down the lateral nasal wall of the left nostril until it fell into the anterior ethmoid cells. The anterior ethmoid cells were exenterated back to the lamina ethmoidalis. The lamina ethmoidalis was breached and the posterior ethmoid cells exenterated back to the rostrum of the sphenoid. The 0 degree scope was taken and the sphenoid ostium viewed and palpated. This was opened to approximately 5 mm in diameter. The 30 degree scope was taken and the maxillary sinus ostium viewed and palpated and opened to  approximately 1 cm in diameter. The 70 degree scope was taken and the nasofrontal duct area viewed, and the anterior ethmoid cells in this region were taken down until the nasofrontal duct was seen to empty widely into the nose. The left nostril was then packed with oxymetazoline pledgets.    The pledgets were removed.  The septum was viewed. A caudal incision was performed, and a mucoperichondrial flap elevated back to the bony cartilaginous junction. A mucoperiosteal flap was elevated on both sides of the bony cartilaginous junction. The anterior 5 mm of bony septum was then taken down from inferior to superior, and the posterior 5 mm of cartilaginous septum was removed. A 3 mm strip of the inferior edge of the cartilaginous septum was removed. This allowed the septum to swing back into the midline over the crest of the maxilla. The areas of deflexion along the cartilaginous septum were then conservatively removed. The septum was then found to be straight in the midline. The caudal incision was then closed with 4-0 plain and a 4-0 whipstitch placed through the septum.    Next, the left and right inferior turbinates were then viewed. They were both hypertrophic producing nasal airway obstruction. The anterior tips of each turbinate were then injected with lidocaine 1% with 1:100,000 epinephrine; approximately 2 mL were used in each turbinate. This was injected over the anterior 2 cm. A bovie was then taken and used to abad the anterior tip of both the left and right inferior turbinate. This was carried back submucosally while being activated, 2 cm along the medial and inferior border of the left and right inferior turbinate. This was done until substantial volume reduction had been accomplished. After removing the bovie from each turbinate, the turbinate was viewed and found to be markedly reduced in size. Each turbinate was then out fractured to open the nose further. Hemostasis was obtained.   A solution of  marcaine  and lidocaine and dexamethasone was  injected into four areas, the sphenopalatine ganglion canal thru the posterior palate, The sphenopalatine area at the junction of the posterior turbinate and sphenoid rostrum, the anterior root of the middle turbinate and  the superciliary nerves as the exit the skull.    This was all done on the left. The left sphenoid sinus was repeatedly lavaged with saline.  Cultures were taken of the left sphenoid sinus prior to beginning the procedure.There was a yellow bubbling purulence emanating from the sinus   The nose and sinuses were packed with Hoffmann nasal packs coated with bacitracin and Sinus packs coated with bacitracin as needed. The patient was awakened and taken to the recovery room in stable condition.

## 2024-11-29 NOTE — BRIEF OP NOTE
[facility] - Surgery  Brief Operative Note     SUMMARY     Surgery Date: 11/29/2024     Surgeons and Role:     * Dennis Martinez MD - Primary        Pre-op Diagnosis:  <principal problem not specified>     Post-op Diagnosis:  Post-Op Diagnosis Codes:     * Chronic ethmoidal sinusitis [J32.2]     * Chronic sphenoidal sinusitis [J32.3]     * Chronic frontal sinusitis [J32.1]     * Chronic maxillary sinusitis [J32.0]     * Hypertrophy of nasal turbinates [J34.3]     * Upper respiratory tract obstruction [J98.8]    Procedure(s) (LRB):  FESS (Bilateral)  NASAL TURBINECTOMY (Bilateral)      Description of the findings of the procedure:  <principal problem not specified>       Estimated Blood Loss: * No values recorded between 11/29/2024  9:13 AM and 11/29/2024 10:37 AM *

## 2024-11-29 NOTE — ANESTHESIA POSTPROCEDURE EVALUATION
Anesthesia Post Evaluation    Patient: Matthew Hawkins    Procedure(s) Performed: Procedure(s) (LRB):  FESS (Bilateral)  NASAL TURBINECTOMY (Bilateral)    Final Anesthesia Type: general      Patient location during evaluation: PACU  Patient participation: Yes- Able to Participate  Level of consciousness: awake and alert and oriented  Post-procedure vital signs: reviewed and stable  Pain management: adequate  Airway patency: patent    PONV status at discharge: No PONV  Anesthetic complications: no      Cardiovascular status: hemodynamically stable  Respiratory status: unassisted, spontaneous ventilation and room air  Hydration status: euvolemic  Follow-up not needed.              Vitals Value Taken Time   /75 11/29/24 1147   Temp 36.6 °C (97.8 °F) 11/29/24 1040   Pulse 73 11/29/24 1149   Resp 21 11/29/24 1149   SpO2 98 % 11/29/24 1147   Vitals shown include unfiled device data.      Event Time   Out of Recovery 11:10:00         Pain/Hiral Score: Hiral Score: 10 (11/29/2024 11:10 AM)  Modified Hiral Score: 20 (11/29/2024 12:00 PM)

## 2024-11-29 NOTE — ANESTHESIA PROCEDURE NOTES
Intubation    Date/Time: 11/29/2024 9:08 AM    Performed by: Chandni Ocasio  Authorized by: Stephan Montgomery MD    Intubation:     Induction:  Intravenous    Intubated:  Postinduction    Mask Ventilation:  Easy mask    Attempts:  1    Attempted By:  CRNA    Method of Intubation:  Video laryngoscopy    Blade:  Martínez 3    Laryngeal View Grade: Grade I - full view of cords      Difficult Airway Encountered?: No      Complications:  None    Airway Device:  Oral endotracheal tube    Airway Device Size:  7.0    Style/Cuff Inflation:  Cuffed    Inflation Amount (mL):  7    Tube secured:  22    Secured at:  The teeth    Placement Verified By:  Capnometry and Fiber optic visualization    Complicating Factors:  None    Findings Post-Intubation:  BS equal bilateral and atraumatic/condition of teeth unchanged

## 2024-11-29 NOTE — PLAN OF CARE
Detail Level: Detailed Discharge criteria met. Escorted via wheelchair to personal vehicle in stable condition. Gauze and tape provided for mustache dressings PRN.   Detail Level: Simple Detail Level: Generalized

## 2024-11-29 NOTE — DISCHARGE INSTRUCTIONS
[facility]   [phone]  [facility]      [phone]      Change/use the mustache dressing as needed for nasal drainage.

## 2024-12-02 LAB — FUNGUS SPEC CULT: NORMAL

## 2024-12-03 LAB — BACTERIA SPEC ANAEROBE CULT: ABNORMAL

## 2024-12-05 LAB
BACTERIA SPEC AEROBE CULT: ABNORMAL
BACTERIA SPEC AEROBE CULT: ABNORMAL

## 2025-03-11 ENCOUNTER — HOSPITAL ENCOUNTER (OUTPATIENT)
Dept: RADIOLOGY | Facility: HOSPITAL | Age: 45
Discharge: HOME | End: 2025-03-11
Attending: NURSE PRACTITIONER
Payer: COMMERCIAL

## 2025-03-11 DIAGNOSIS — N63.15 MASS OVERLAPPING MULTIPLE QUADRANTS OF RIGHT BREAST: ICD-10-CM

## 2025-03-11 PROCEDURE — 77066 DX MAMMO INCL CAD BI: CPT | Mod: TC

## 2025-03-11 PROCEDURE — 76641 ULTRASOUND BREAST COMPLETE: CPT | Mod: TC,RT

## 2025-03-11 PROCEDURE — 77066 DX MAMMO INCL CAD BI: CPT | Mod: 26,,, | Performed by: RADIOLOGY

## 2025-03-11 PROCEDURE — 77062 BREAST TOMOSYNTHESIS BI: CPT | Mod: 26,,, | Performed by: RADIOLOGY

## 2025-03-19 ENCOUNTER — HOSPITAL ENCOUNTER (OUTPATIENT)
Dept: RADIOLOGY | Facility: HOSPITAL | Age: 45
Discharge: HOME | End: 2025-03-19
Attending: NURSE PRACTITIONER
Payer: COMMERCIAL

## 2025-03-19 DIAGNOSIS — N63.12 MASS OF UPPER INNER QUADRANT OF RIGHT BREAST: ICD-10-CM

## 2025-03-19 PROCEDURE — 77065 DX MAMMO INCL CAD UNI: CPT | Mod: 26,RT,, | Performed by: RADIOLOGY

## 2025-03-19 PROCEDURE — 88342 IMHCHEM/IMCYTCHM 1ST ANTB: CPT | Performed by: PATHOLOGY

## 2025-03-19 PROCEDURE — 77061 BREAST TOMOSYNTHESIS UNI: CPT | Mod: 26,RT,, | Performed by: RADIOLOGY

## 2025-03-19 PROCEDURE — 88305 TISSUE EXAM BY PATHOLOGIST: CPT | Performed by: PATHOLOGY

## 2025-03-19 PROCEDURE — 88341 IMHCHEM/IMCYTCHM EA ADD ANTB: CPT | Performed by: PATHOLOGY

## 2025-03-19 PROCEDURE — 27200937 US BREAST BIOPSY WITH IMAGING 1ST SITE RIGHT

## 2025-03-19 PROCEDURE — 19083 BX BREAST 1ST LESION US IMAG: CPT | Mod: RT,,, | Performed by: RADIOLOGY

## 2025-03-19 PROCEDURE — 77061 BREAST TOMOSYNTHESIS UNI: CPT | Mod: TC,RT

## 2025-03-21 LAB
COMMENT: NORMAL
FINAL PATHOLOGIC DIAGNOSIS: NORMAL
GROSS: NORMAL
Lab: NORMAL

## 2025-05-08 DIAGNOSIS — S21.001S UNSPECIFIED OPEN WOUND OF RIGHT BREAST, SEQUELA: Primary | ICD-10-CM

## 2025-05-15 ENCOUNTER — OFFICE VISIT (OUTPATIENT)
Dept: WOUND CARE | Facility: HOSPITAL | Age: 45
End: 2025-05-15
Attending: FAMILY MEDICINE
Payer: COMMERCIAL

## 2025-05-15 VITALS
DIASTOLIC BLOOD PRESSURE: 71 MMHG | HEART RATE: 66 BPM | SYSTOLIC BLOOD PRESSURE: 107 MMHG | TEMPERATURE: 98 F | BODY MASS INDEX: 24.65 KG/M2 | RESPIRATION RATE: 16 BRPM | HEIGHT: 72 IN | WEIGHT: 182 LBS

## 2025-05-15 DIAGNOSIS — T81.89XA SURGICAL WOUND, NON HEALING, INITIAL ENCOUNTER: ICD-10-CM

## 2025-05-15 DIAGNOSIS — S21.001S UNSPECIFIED OPEN WOUND OF RIGHT BREAST, SEQUELA: ICD-10-CM

## 2025-05-15 DIAGNOSIS — T81.31XA DISRUPTION OF EXTERNAL SURGICAL WOUND, INITIAL ENCOUNTER: Primary | ICD-10-CM

## 2025-05-15 PROCEDURE — 99214 OFFICE O/P EST MOD 30 MIN: CPT | Mod: PN | Performed by: FAMILY MEDICINE

## 2025-05-15 RX ORDER — MUPIROCIN 20 MG/G
OINTMENT TOPICAL 2 TIMES DAILY
Qty: 30 G | Refills: 2 | Status: SHIPPED | OUTPATIENT
Start: 2025-05-15

## 2025-05-15 NOTE — PROGRESS NOTES
Chief complaint:  Wound Care      HPI:  Matthew Hawkins is a 44 y.o. female presenting with an eschar covered surgical wound of the right breast. Pt had a FNB in March and the wound has not healed. Wound formed an eschar and stagnated. Pt was seen by PCP and referred to the clinic for treatment. No other complaints today    PMH:  As per HPI and below:  Past Medical History:   Diagnosis Date    Chronic sinusitis, unspecified     Hypertrophy of nasal turbinates     Migraines        Social History     Socioeconomic History    Marital status:    Tobacco Use    Smoking status: Never   Substance and Sexual Activity    Alcohol use: Not Currently    Drug use: Never    Sexual activity: Yes     Partners: Male     Birth control/protection: I.U.D.     Comment: liletta inserted 3/6/19   Social History Narrative    ADVANCED MD PLANS        IUD insertion    Contraceptive Management        Visit Summary    Discussed IUD at length        Return for follow up appointment in one month for IUD check.     [facility]     Annual    IUD removal    Contraceptive management    Migraines        Visit Summary    Ordered:    glucose  abnormal w/ reflex  100    Blood Drawing    Lipid Panel    Pap w/ hpv        Discussed progesterone only methods with patient.    Pt wants to try Loloestrin first, discussed risks with chronic migraines. Sample pack given x 2 and a coupon card.    Pt is a nonsmoker.        Return for follow up appointment prn/annual.       Past Surgical History:   Procedure Laterality Date    CARDIAC CATHETERIZATION      CARDIAC SURGERY      CHOLECYSTECTOMY      FESS, WITH SPHENOETHMOIDECTOMY Bilateral 11/29/2024    Procedure: FESS;  Surgeon: Dennis Martinez MD;  Location: [facility] OR;  Service: ENT;  Laterality: Bilateral;    KNEE SURGERY      SINUS SURGERY      SURGICAL REMOVAL OF NASAL TURBINATE Bilateral 11/29/2024    Procedure: NASAL TURBINECTOMY;  Surgeon: Dennis Martinez MD;   "Location: [facility] OR;  Service: ENT;  Laterality: Bilateral;       Family History   Problem Relation Name Age of Onset    Prostate cancer Father      Skin cancer Mother         Review of patient's allergies indicates:  No Known Allergies    Medications Ordered Prior to Encounter[1]    ROS: As per HPI and below:  Pertinent items are noted in HPI.      Physical Exam:     Vitals:    05/15/25 0805   BP: 107/71   Pulse: 66   Resp: 16   Temp: 97.6 °F (36.4 °C)   Weight: 82.6 kg (182 lb)   Height: 6' (1.829 m)           Body mass index is 24.68 kg/m².          General:             Well developed, well nourished, no apparent distress  HEENT:              NCAT, no JVD, mucous membranes moist, EOM intact  Cardiovascular:  Regular rate and rhythm, normal S1, normal S2, No murmurs, rubs, or gallops  Respiratory:        Normal breath sounds, no wheezes, no rales, no rhonchi  Abdomen:           Bowel sounds present, non tender, non distended, no masses, no hepatojugular reflux  Extremities:        No clubbing, no cyanosis, no edema  Vascular:            2+ b/l radial.  Peripheral pulses intact.  No carotid bruits.  Neurological:      No focal deficits  Skin:                   No obvious rashes or erythema, open wound of the right breast eschar covered, see wound care assessment documentation in chart review    Lab Results   Component Value Date    WBC 9.61 11/27/2024    HGB 13.7 11/27/2024    HCT 42.2 11/27/2024     (H) 11/27/2024     11/27/2024     Lab Results   Component Value Date    CHOL 172 02/27/2025    CHOL 161 06/18/2021     Lab Results   Component Value Date    HDL 48 02/27/2025    HDL 46 06/18/2021     Lab Results   Component Value Date    LDLCALC 106 (H) 02/27/2025    LDLCALC 100 (H) 06/18/2021     Lab Results   Component Value Date    TRIG 101 02/27/2025    TRIG 81 06/18/2021     No results found for: "TSH"  Specimen to Pathology, Radiology Breast, needle biopsy  Order: [id number]   Status: Final result     "   Next appt: None    Test Result Released: Yes (seen)    0 Result Notes      Component  Ref Range & Units (hover) 1 mo ago   Final Pathologic Diagnosis Right breast, 1 o'clock 2 cm from the nipple, core biopsies:  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    Negative for atypical hyperplasia and malignancy.  See comment.   Comment: Interp By Awilda Olvera M.D., Signed on 03/21/2025 at 14:41   Gross Pathology ID# [id number]  Pathology MRN # [id number]  Hospital/Clinic label MRN# [id number]  CSN:  [id number]    Received in formalin labeled with the patient's name and medical record number, designated as, &quot;right breast 1:00 2CMFN&quot;, is a 1.8 x 1.0 x 0.4 cm aggregate of ragged, variegated, tan-white to tan-brown friable fibrofatty tissue fragments.    Entirely submitted in cassettes NHQ--1-A and KIA--1-B.    Time collected:  Not provided  Time placed in formalin: 1200  03/19/2025  Cold ischemic time:  Can not calculate      Verito Campos MD, MS  Pathologists' Assistant   Comment The core biopsies show a fibroepithelial lesion with prominent fragmentation, leaf-like architecture and patchy myxoid changes.  However, there is no significantly increased cellularity or mitotic rate, no stromal expansion and no significant nuclear  pleomorphism.  These features are not entirely specific, raising differential considerations of fibroadenoma with prominent intracanalicular growth pattern or Low-grade Phyllodes tumor.  Clinical and radiographic correlation are required.  CK5/6 and ER  immunohistochemical stains were performed to further evaluate the epithelial hyperplasia.  CK5/6 stains show focal areas of loss, however, ER stains show patchy positivity.  There is no definitive evidence of atypical hyperplasia.  The stains were  examined with the appropriate controls.  The case has also been reviewed by Dr. Smith who agrees with the above diagnosis.   Disclaimer Unless the case is a 'gross  only' or additional testing only, the final diagnosis for each specimen is based on a microscopic examination of appropriate tissue sections.  ER immunohistochemical staining (clone SP1, DAB detection method) is performed on formalin-fixed, paraffin embedded tissue sections. The percentage of cell nuclei stained and the strength of staining is reported (weak, moderate, strong), using the 2010  ACSO/CAP scoring guidelines. Tumors used for determining predictive markers are fixed in 10% neutral buffered formalin for 6-72 hours. It has been cleared by the [place] FDA for use as an IVD test. This assay has not been validated on decalcified tissues.  Results should be interpreted with caution given the likelihood of false negativity on decalcified specimens.   [facility]              Narrative  Performed by: [employer]  Rt Breast 1:00 2 CMFN  4 passes  Q clip  Release to patient->Immediate   Specimen Collected: 25 12:29 CDT Last Resulted: 25 15:08 CDT         Assessment and Recommendations       Diagnoses:    Right breast surgical wound, non healing    Plan:  Bactroban + dry dressing daily  See wound care instructions provided separately    Complexity:    moderate           [1]   Current Outpatient Medications on File Prior to Visit   Medication Sig Dispense Refill    EMGALITY  mg/mL PnIj Inject 120 mg (1mL) under the skin every month      rizatriptan (MAXALT) 10 MG tablet SMARTSI Tablet(s) By Mouth 1-2 Times Daily      topiramate (TOPAMAX) 100 MG tablet Take 100 mg by mouth every evening.       No current facility-administered medications on file prior to visit.

## 2025-05-22 ENCOUNTER — OFFICE VISIT (OUTPATIENT)
Dept: WOUND CARE | Facility: HOSPITAL | Age: 45
End: 2025-05-22
Attending: FAMILY MEDICINE
Payer: COMMERCIAL

## 2025-05-22 DIAGNOSIS — T81.89XD SURGICAL WOUND, NON HEALING, SUBSEQUENT ENCOUNTER: ICD-10-CM

## 2025-05-22 DIAGNOSIS — T81.31XD DISRUPTION OF EXTERNAL SURGICAL WOUND, SUBSEQUENT ENCOUNTER: Primary | ICD-10-CM

## 2025-05-22 DIAGNOSIS — S21.001S UNSPECIFIED OPEN WOUND OF RIGHT BREAST, SEQUELA: ICD-10-CM

## 2025-05-22 PROCEDURE — 11042 DBRDMT SUBQ TIS 1ST 20SQCM/<: CPT | Mod: ,,, | Performed by: FAMILY MEDICINE

## 2025-05-22 PROCEDURE — 99499 UNLISTED E&M SERVICE: CPT | Mod: ,,, | Performed by: FAMILY MEDICINE

## 2025-05-22 PROCEDURE — 11042 DBRDMT SUBQ TIS 1ST 20SQCM/<: CPT | Mod: PN | Performed by: FAMILY MEDICINE

## 2025-05-22 NOTE — PROGRESS NOTES
Wound Care Progress Note    Subjective:       Patient ID: Matthew Hawkins is a 44 y.o. female.    Chief Complaint: No chief complaint on file.    HPI  Pt seen in clinic for a FU visit for a right breast open surgical wound. Wound is stable, thick necrotic tissue in the wound. No new complaints today  Review of Systems   Skin:  Positive for wound.        Open wound right breast   All other systems reviewed and are negative.      Objective:        Physical Exam  Vitals and nursing note reviewed.   Constitutional:       General: She is not in acute distress.     Appearance: Normal appearance.   Skin:     General: Skin is warm and dry.      Findings: Lesion present.      Comments: Right breast open wound, see wound care assessment documentation in chart review scanned under the media tab   Neurological:      General: No focal deficit present.      Mental Status: She is alert.   Psychiatric:         Judgment: Judgment normal.       There were no vitals filed for this visit.    Assessment:           ICD-10-CM ICD-9-CM   1. Disruption of external surgical wound, subsequent encounter  T81.31XD V58.89     998.32   2. Unspecified open wound of right breast, sequela  S21.001S 906.0   3. Surgical wound, non healing, subsequent encounter  T81.89XD V58.89     998.83                Plan:                  Diagnoses and all orders for this visit:    Disruption of external surgical wound, subsequent encounter    Unspecified open wound of right breast, sequela    Surgical wound, non healing, subsequent encounter      See attached wound care documentation.

## 2025-05-23 VITALS
RESPIRATION RATE: 18 BRPM | HEART RATE: 70 BPM | TEMPERATURE: 98 F | SYSTOLIC BLOOD PRESSURE: 121 MMHG | DIASTOLIC BLOOD PRESSURE: 86 MMHG

## 2025-05-29 ENCOUNTER — OFFICE VISIT (OUTPATIENT)
Dept: WOUND CARE | Facility: HOSPITAL | Age: 45
End: 2025-05-29
Attending: FAMILY MEDICINE
Payer: COMMERCIAL

## 2025-05-29 VITALS
HEART RATE: 79 BPM | DIASTOLIC BLOOD PRESSURE: 79 MMHG | TEMPERATURE: 98 F | RESPIRATION RATE: 18 BRPM | SYSTOLIC BLOOD PRESSURE: 125 MMHG

## 2025-05-29 DIAGNOSIS — T81.31XD DISRUPTION OF EXTERNAL SURGICAL WOUND, SUBSEQUENT ENCOUNTER: Primary | ICD-10-CM

## 2025-05-29 DIAGNOSIS — T81.89XD SURGICAL WOUND, NON HEALING, SUBSEQUENT ENCOUNTER: ICD-10-CM

## 2025-05-29 DIAGNOSIS — S21.001S UNSPECIFIED OPEN WOUND OF RIGHT BREAST, SEQUELA: ICD-10-CM

## 2025-05-29 PROCEDURE — 97602 WOUND(S) CARE NON-SELECTIVE: CPT | Mod: PN | Performed by: FAMILY MEDICINE

## 2025-05-29 NOTE — PROGRESS NOTES
Wound Care Progress Note    Subjective:       Patient ID: Matthew Hawkins is a 44 y.o. female.    Chief Complaint: Wound Care    HPI  Pt seen in clinic for a FU visit fopr a right breast surgical wound. Wound continues to improve, no new complaints today  Review of Systems   Skin:  Positive for wound.        Open wound right breast   All other systems reviewed and are negative.      Objective:        Physical Exam  Vitals and nursing note reviewed.   Constitutional:       Appearance: Normal appearance.   Skin:     General: Skin is warm and dry.      Findings: Lesion present.      Comments: Right breast surgical wound, see wound care assessment documentation in chart review scanned under the media tab   Neurological:      Mental Status: She is alert.   Psychiatric:         Mood and Affect: Mood normal.         Judgment: Judgment normal.       There were no vitals filed for this visit.    Assessment:           ICD-10-CM ICD-9-CM   1. Disruption of external surgical wound, subsequent encounter  T81.31XD V58.89     998.32   2. Unspecified open wound of right breast, sequela  S21.001S 906.0   3. Surgical wound, non healing, subsequent encounter  T81.89XD V58.89     998.83                Plan:                  Matthew was seen today for wound care.    Diagnoses and all orders for this visit:    Disruption of external surgical wound, subsequent encounter    Unspecified open wound of right breast, sequela    Surgical wound, non healing, subsequent encounter      See attached wound care documentation. See attached wound care documentation.

## 2025-06-05 ENCOUNTER — OFFICE VISIT (OUTPATIENT)
Dept: WOUND CARE | Facility: HOSPITAL | Age: 45
End: 2025-06-05
Attending: FAMILY MEDICINE
Payer: COMMERCIAL

## 2025-06-05 VITALS
TEMPERATURE: 98 F | SYSTOLIC BLOOD PRESSURE: 128 MMHG | RESPIRATION RATE: 18 BRPM | DIASTOLIC BLOOD PRESSURE: 79 MMHG | HEART RATE: 80 BPM

## 2025-06-05 DIAGNOSIS — T81.31XD DISRUPTION OF EXTERNAL SURGICAL WOUND, SUBSEQUENT ENCOUNTER: Primary | ICD-10-CM

## 2025-06-05 DIAGNOSIS — T81.89XD SURGICAL WOUND, NON HEALING, SUBSEQUENT ENCOUNTER: ICD-10-CM

## 2025-06-05 DIAGNOSIS — S21.001S UNSPECIFIED OPEN WOUND OF RIGHT BREAST, SEQUELA: ICD-10-CM

## 2025-06-05 PROCEDURE — 11042 DBRDMT SUBQ TIS 1ST 20SQCM/<: CPT | Mod: ,,, | Performed by: FAMILY MEDICINE

## 2025-06-05 PROCEDURE — 11042 DBRDMT SUBQ TIS 1ST 20SQCM/<: CPT | Mod: PN | Performed by: FAMILY MEDICINE

## 2025-06-05 PROCEDURE — 99499 UNLISTED E&M SERVICE: CPT | Mod: ,,, | Performed by: FAMILY MEDICINE

## 2025-06-20 ENCOUNTER — OFFICE VISIT (OUTPATIENT)
Dept: OBSTETRICS AND GYNECOLOGY | Facility: CLINIC | Age: 45
End: 2025-06-20
Payer: COMMERCIAL

## 2025-06-20 VITALS
HEART RATE: 73 BPM | BODY MASS INDEX: 24.38 KG/M2 | HEIGHT: 72 IN | DIASTOLIC BLOOD PRESSURE: 78 MMHG | SYSTOLIC BLOOD PRESSURE: 107 MMHG | WEIGHT: 180 LBS

## 2025-06-20 DIAGNOSIS — N90.7 VULVAR CYST: Primary | ICD-10-CM

## 2025-06-20 PROCEDURE — 99999 PR PBB SHADOW E&M-EST. PATIENT-LVL III: CPT | Mod: PBBFAC,,,

## 2025-06-20 RX ORDER — SULFAMETHOXAZOLE AND TRIMETHOPRIM 400; 80 MG/1; MG/1
1 TABLET ORAL 2 TIMES DAILY
Qty: 20 TABLET | Refills: 0 | Status: SHIPPED | OUTPATIENT
Start: 2025-06-20 | End: 2025-06-30

## 2025-06-20 NOTE — PROGRESS NOTES
Gynecology Visit  History & Physical      SUBJECTIVE:     History of Present Illness:  Patient is a 44 y.o. female presents with complaints of lump in vagina.  Patient 1st noticed symptoms on Saturday.  Patient denies any change in discharge, itching, or vaginal odor.  Patient tried Monistat last weekend with no relief of symptoms.  Patient reports area is not painful to the touch, but does have sensation of having to go to the bathroom all the time.  Describes sensation as a cramping like pain internally.    Chief Complaint   Patient presents with    Cramping    Groin Swelling     Pt states she has a lump in genital area        Review of patient's allergies indicates:  No Known Allergies    Current Medications[1]  OB History          2    Para   2    Term   2            AB   0    Living   2         SAB        IAB        Ectopic        Multiple        Live Births   2             No LMP recorded (lmp unknown). (Menstrual status: Birth Control).      Past Medical History:   Diagnosis Date    Breast cyst, right     Chronic sinusitis, unspecified     Hypertrophy of nasal turbinates     Migraines      Past Surgical History:   Procedure Laterality Date    BREAST CYST EXCISION Left     CARDIAC CATHETERIZATION      CARDIAC SURGERY      CHOLECYSTECTOMY      FESS, WITH SPHENOETHMOIDECTOMY Bilateral 2024    Procedure: FESS;  Surgeon: Dennis Martinez MD;  Location: [facility] OR;  Service: ENT;  Laterality: Bilateral;    KNEE SURGERY      SINUS SURGERY      SURGICAL REMOVAL OF NASAL TURBINATE Bilateral 2024    Procedure: NASAL TURBINECTOMY;  Surgeon: Dennis Martinez MD;  Location: [facility] OR;  Service: ENT;  Laterality: Bilateral;     Family History   Problem Relation Name Age of Onset    Prostate cancer Father      Skin cancer Mother      Breast cancer Paternal Aunt      Colon cancer Neg Hx      Uterine cancer Neg Hx      Ovarian cancer Neg Hx       Social History[2]     OBJECTIVE:     Vital Signs  (Most Recent)  Pulse: 73 (25 0928)  BP: 107/78 (25 0928)  6' (1.829 m)  81.6 kg (180 lb)     Physical Exam:  Physical Exam  Vitals and nursing note reviewed.   Constitutional:       General: She is awake.   Pulmonary:      Effort: Pulmonary effort is normal.   Genitourinary:     Exam position: Lithotomy position.      Labia:         Right: Lesion present.       Vagina: Normal.          Comments: Pea sized hardened white filled cyst noted to right labia minora, no exudate present.   Skin:     General: Skin is warm and dry.   Neurological:      Mental Status: She is alert and oriented to person, place, and time.   Psychiatric:         Attention and Perception: Attention and perception normal.         Mood and Affect: Mood and affect normal.         Speech: Speech normal.         Behavior: Behavior normal. Behavior is cooperative.         Thought Content: Thought content normal.         Cognition and Memory: Cognition normal.         Judgment: Judgment normal.       ASSESSMENT/PLAN:       ICD-10-CM ICD-9-CM   1. Vulvar cyst  N90.7 624.8     PLAN:  Discussed warm Epsom salt Sitz bath  Rx for Bactrim sent to patient pharmacy with instructions for 10 days  Avoid touching or squeezing, may notice drainage from area  Last Pap: approximate date 2025 and was normal per pt  Last mammo: was abnormal: breast biopsy and mass removal performed   Return to clinic as needed     Thank you for allowing me to participate in your care today. It is an honor to be a part of your healthcare team at [facility]. If you have any questions or concerns regarding your visit today, please do not hesitate to contact us.    Marta Nicolas, [facility]  Gynecology    [address] [place] [address] [phone]          [1]   Current Outpatient Medications   Medication Sig Dispense Refill    EMGALITY  mg/mL PnIj Inject 120 mg (1mL) under the skin every month      rizatriptan (MAXALT) 10 MG tablet SMARTSI Tablet(s) By  Mouth 1-2 Times Daily      topiramate (TOPAMAX) 100 MG tablet Take 100 mg by mouth every evening.      sulfamethoxazole-trimethoprim 400-80mg (BACTRIM) 400-80 mg per tablet Take 1 tablet by mouth 2 (two) times daily. for 10 days 20 tablet 0     No current facility-administered medications for this visit.   [2]   Social History  Tobacco Use    Smoking status: Never     Passive exposure: Never   Substance Use Topics    Alcohol use: Not Currently    Drug use: Never

## 2025-07-09 ENCOUNTER — OFFICE VISIT (OUTPATIENT)
Dept: OBSTETRICS AND GYNECOLOGY | Facility: CLINIC | Age: 45
End: 2025-07-09
Payer: COMMERCIAL

## 2025-07-09 VITALS
WEIGHT: 180 LBS | BODY MASS INDEX: 24.38 KG/M2 | DIASTOLIC BLOOD PRESSURE: 87 MMHG | SYSTOLIC BLOOD PRESSURE: 121 MMHG | HEIGHT: 72 IN | HEART RATE: 68 BPM

## 2025-07-09 DIAGNOSIS — N90.7 VULVAR CYST: ICD-10-CM

## 2025-07-09 DIAGNOSIS — N36.8 SKENE'S GLAND CYST: Primary | ICD-10-CM

## 2025-07-09 PROCEDURE — 99999 PR PBB SHADOW E&M-EST. PATIENT-LVL III: CPT | Mod: PBBFAC,,,

## 2025-07-09 NOTE — PROGRESS NOTES
Gynecology Visit  History & Physical      SUBJECTIVE:     History of Present Illness:  Patient is a 44 y.o. female presents to follow-up on vulvar cyst.   On 25  patient noticed small lump on right vulva.  Patient reported constant feeling of having to urinate again internal cramping.  Patient completed full round of Bactrim and has performed warm Epsom salt Sitz baths and denies any change in symptoms.  Patient denies any pain or symptoms associated with cyst.  Feels as if area fluctuates in size from day-to-day.  Patient denies any drainage.    Chief Complaint   Patient presents with    Follow-up     Vulvar Cyst        Review of patient's allergies indicates:  No Known Allergies    Current Medications[1]  OB History          2    Para   2    Term   2            AB   0    Living   2         SAB        IAB        Ectopic        Multiple        Live Births   2             No LMP recorded (lmp unknown). (Menstrual status: Birth Control).      Past Medical History:   Diagnosis Date    Breast cyst, right     Chronic sinusitis, unspecified     Hypertrophy of nasal turbinates     Migraines      Past Surgical History:   Procedure Laterality Date    BREAST CYST EXCISION Left     CARDIAC CATHETERIZATION      CARDIAC SURGERY      CHOLECYSTECTOMY      FESS, WITH SPHENOETHMOIDECTOMY Bilateral 2024    Procedure: FESS;  Surgeon: Dennis Martinez MD;  Location: [facility] OR;  Service: ENT;  Laterality: Bilateral;    KNEE SURGERY      SINUS SURGERY      SURGICAL REMOVAL OF NASAL TURBINATE Bilateral 2024    Procedure: NASAL TURBINECTOMY;  Surgeon: Dennis Martinez MD;  Location: [facility] OR;  Service: ENT;  Laterality: Bilateral;     Family History   Problem Relation Name Age of Onset    Prostate cancer Father      Skin cancer Mother      Breast cancer Paternal Aunt      Colon cancer Neg Hx      Uterine cancer Neg Hx      Ovarian cancer Neg Hx       Social History[2]     OBJECTIVE:     Vital Signs (Most  "Recent)  Pulse: 68 (25 1343)  BP: 121/87 (25 1343)  6' 1" (1.854 m)  81.6 kg (180 lb)     Physical Exam:  Physical Exam  Vitals and nursing note reviewed.   Constitutional:       General: She is awake.   Pulmonary:      Effort: Pulmonary effort is normal.   Genitourinary:         Comments: Pea sized fluid filled cyst, no drainage present possible   Skin:     General: Skin is warm and dry.   Neurological:      Mental Status: She is alert and oriented to person, place, and time.   Psychiatric:         Attention and Perception: Attention and perception normal.         Mood and Affect: Mood and affect normal.         Speech: Speech normal.         Behavior: Behavior normal. Behavior is cooperative.         Thought Content: Thought content normal.         Cognition and Memory: Cognition normal.         Judgment: Judgment normal.       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 Vulvar cyst  N90.7 624.8       PLAN:  Continue warm Epsom salt Sitz baths  Since not symptomatic, recommend conservative therapy  Last Pap: approximate date 2025 and was normal  Last mammo: was abnormal: 3/2025  Contraception: Mirena IUD placed in 2019  Follow up in 1 year for annual exam or sooner as needed    Thank you for allowing me to participate in your care today. It is an honor to be a part of your healthcare team at [facility]. If you have any questions or concerns regarding your visit today, please do not hesitate to contact us.    Marta Nicolas, [facility]  Gynecology    [address] [place] [address]    [phone]         [1]   Current Outpatient Medications   Medication Sig Dispense Refill    EMGALITY  mg/mL PnIj Inject 120 mg (1mL) under the skin every month      rizatriptan (MAXALT) 10 MG tablet SMARTSI Tablet(s) By Mouth 1-2 Times Daily      topiramate (TOPAMAX) 100 MG tablet Take 100 mg by mouth every evening.       No current facility-administered medications " for this visit.   [2]   Social History  Tobacco Use    Smoking status: Never     Passive exposure: Never   Substance Use Topics    Alcohol use: Not Currently    Drug use: Never

## (undated) DEVICE — BLADE SURG #15 CARBON STEEL

## (undated) DEVICE — NDL SPINAL 25GX3.5 SPINOCAN

## (undated) DEVICE — CONTAINER SPECIMEN OR STER 4OZ

## (undated) DEVICE — CANISTER SUCTION RIGID 3000CC

## (undated) DEVICE — SUT 2/0 30IN SILK BLK BRAI

## (undated) DEVICE — NDL BLUNT FILL PNK 18G 1-0.5IN

## (undated) DEVICE — SYR 12CC CNTRL L-L NO NDL

## (undated) DEVICE — GLOVE SENSICARE PI GRN 7

## (undated) DEVICE — DEVICE INFLATION SE SINUS BLLN

## (undated) DEVICE — GLOVE SENSICARE PI SURG 8

## (undated) DEVICE — ELECTRODE REM PLYHSV RETURN 9

## (undated) DEVICE — BLLN SYS SIMUPLASTY 6MM

## (undated) DEVICE — KIT COLLECTION E SWAB REGULAR

## (undated) DEVICE — NDL ECLIPSE SAF REG 25GX1.5IN

## (undated) DEVICE — SPONGE PATTY SURGICAL .5X3IN

## (undated) DEVICE — SYR ONLY LUER LOCK 20CC

## (undated) DEVICE — SYR 30CC LUER LOCK

## (undated) DEVICE — PENCIL ROCKER SWITCH 10FT CORD

## (undated) DEVICE — PACK NASAL SINUS

## (undated) DEVICE — GLOVE SENSICARE PI SURG 7

## (undated) DEVICE — SPONGE GAUZE 16PLY 4X4

## (undated) DEVICE — WIPE MICRO TIP

## (undated) DEVICE — NDL ELECTRODE E-Z CLEAN 2.75IN